# Patient Record
Sex: FEMALE | Race: WHITE | Employment: UNEMPLOYED | URBAN - METROPOLITAN AREA
[De-identification: names, ages, dates, MRNs, and addresses within clinical notes are randomized per-mention and may not be internally consistent; named-entity substitution may affect disease eponyms.]

---

## 2019-03-28 PROCEDURE — 82390 ASSAY OF CERULOPLASMIN: CPT | Performed by: INTERNAL MEDICINE

## 2019-03-28 PROCEDURE — 83516 IMMUNOASSAY NONANTIBODY: CPT | Performed by: INTERNAL MEDICINE

## 2019-03-28 PROCEDURE — 81256 HFE GENE: CPT | Performed by: INTERNAL MEDICINE

## 2019-03-28 PROCEDURE — 86376 MICROSOMAL ANTIBODY EACH: CPT | Performed by: INTERNAL MEDICINE

## 2019-03-28 PROCEDURE — 82103 ALPHA-1-ANTITRYPSIN TOTAL: CPT | Performed by: INTERNAL MEDICINE

## 2019-03-28 PROCEDURE — 82784 ASSAY IGA/IGD/IGG/IGM EACH: CPT | Performed by: INTERNAL MEDICINE

## 2019-08-30 PROBLEM — M77.8 ENTHESOPATHY OF RIGHT WRIST: Status: ACTIVE | Noted: 2019-08-30

## 2019-10-10 PROBLEM — L71.9 ROSACEA: Status: ACTIVE | Noted: 2019-10-10

## 2020-08-27 PROBLEM — R13.19 ESOPHAGEAL DYSPHAGIA: Status: ACTIVE | Noted: 2020-08-27

## 2020-08-27 PROBLEM — K22.5: Status: ACTIVE | Noted: 2020-08-27

## 2021-08-11 ENCOUNTER — ORDER TRANSCRIPTION (OUTPATIENT)
Dept: ADMINISTRATIVE | Facility: HOSPITAL | Age: 46
End: 2021-08-11

## 2021-08-11 DIAGNOSIS — Z11.59 ENCOUNTER FOR SCREENING FOR OTHER VIRAL DISEASES: ICD-10-CM

## 2021-08-11 DIAGNOSIS — Z01.818 PREOP EXAMINATION: Primary | ICD-10-CM

## 2022-10-11 ENCOUNTER — APPOINTMENT (OUTPATIENT)
Dept: CT IMAGING | Facility: HOSPITAL | Age: 47
End: 2022-10-11
Attending: EMERGENCY MEDICINE
Payer: COMMERCIAL

## 2022-10-11 ENCOUNTER — HOSPITAL ENCOUNTER (EMERGENCY)
Facility: HOSPITAL | Age: 47
Discharge: HOME | End: 2022-10-12
Attending: EMERGENCY MEDICINE
Payer: COMMERCIAL

## 2022-10-11 ENCOUNTER — APPOINTMENT (OUTPATIENT)
Dept: ULTRASOUND IMAGING | Facility: HOSPITAL | Age: 47
End: 2022-10-11
Attending: EMERGENCY MEDICINE
Payer: COMMERCIAL

## 2022-10-11 VITALS
TEMPERATURE: 98 F | RESPIRATION RATE: 18 BRPM | BODY MASS INDEX: 21.17 KG/M2 | DIASTOLIC BLOOD PRESSURE: 88 MMHG | WEIGHT: 124 LBS | HEART RATE: 104 BPM | OXYGEN SATURATION: 96 % | HEIGHT: 64 IN | SYSTOLIC BLOOD PRESSURE: 134 MMHG

## 2022-10-11 DIAGNOSIS — N30.00 ACUTE CYSTITIS WITHOUT HEMATURIA: Primary | ICD-10-CM

## 2022-10-11 DIAGNOSIS — R10.9 ABDOMINAL PAIN OF UNKNOWN ETIOLOGY: ICD-10-CM

## 2022-10-11 DIAGNOSIS — N17.9 ACUTE KIDNEY INJURY (HCC): ICD-10-CM

## 2022-10-11 LAB
ALBUMIN SERPL-MCNC: 3.9 G/DL (ref 3.4–5)
ALBUMIN/GLOB SERPL: 0.9 {RATIO} (ref 1–2)
ALP LIVER SERPL-CCNC: 83 U/L
ALT SERPL-CCNC: 37 U/L
ANION GAP SERPL CALC-SCNC: 9 MMOL/L (ref 0–18)
AST SERPL-CCNC: 27 U/L (ref 15–37)
B-HCG UR QL: NEGATIVE
BASOPHILS # BLD AUTO: 0.04 X10(3) UL (ref 0–0.2)
BASOPHILS NFR BLD AUTO: 0.4 %
BILIRUB SERPL-MCNC: 0.5 MG/DL (ref 0.1–2)
BILIRUB UR QL STRIP.AUTO: NEGATIVE
BUN BLD-MCNC: 16 MG/DL (ref 7–18)
CALCIUM BLD-MCNC: 9.7 MG/DL (ref 8.5–10.1)
CHLORIDE SERPL-SCNC: 96 MMOL/L (ref 98–112)
CO2 SERPL-SCNC: 28 MMOL/L (ref 21–32)
COLOR UR AUTO: YELLOW
CREAT BLD-MCNC: 1.82 MG/DL
EOSINOPHIL # BLD AUTO: 0.01 X10(3) UL (ref 0–0.7)
EOSINOPHIL NFR BLD AUTO: 0.1 %
ERYTHROCYTE [DISTWIDTH] IN BLOOD BY AUTOMATED COUNT: 12.1 %
GFR SERPLBLD BASED ON 1.73 SQ M-ARVRAT: 34 ML/MIN/1.73M2 (ref 60–?)
GLOBULIN PLAS-MCNC: 4.2 G/DL (ref 2.8–4.4)
GLUCOSE BLD-MCNC: 120 MG/DL (ref 70–99)
GLUCOSE UR STRIP.AUTO-MCNC: 50 MG/DL
HCT VFR BLD AUTO: 39.8 %
HGB BLD-MCNC: 14.3 G/DL
IMM GRANULOCYTES # BLD AUTO: 0.05 X10(3) UL (ref 0–1)
IMM GRANULOCYTES NFR BLD: 0.5 %
KETONES UR STRIP.AUTO-MCNC: NEGATIVE MG/DL
LYMPHOCYTES # BLD AUTO: 1.08 X10(3) UL (ref 1–4)
LYMPHOCYTES NFR BLD AUTO: 10.7 %
MCH RBC QN AUTO: 36.2 PG (ref 26–34)
MCHC RBC AUTO-ENTMCNC: 35.9 G/DL (ref 31–37)
MCV RBC AUTO: 100.8 FL
MONOCYTES # BLD AUTO: 1.15 X10(3) UL (ref 0.1–1)
MONOCYTES NFR BLD AUTO: 11.4 %
NEUTROPHILS # BLD AUTO: 7.78 X10 (3) UL (ref 1.5–7.7)
NEUTROPHILS # BLD AUTO: 7.78 X10(3) UL (ref 1.5–7.7)
NEUTROPHILS NFR BLD AUTO: 76.9 %
NITRITE UR QL STRIP.AUTO: NEGATIVE
OSMOLALITY SERPL CALC.SUM OF ELEC: 278 MOSM/KG (ref 275–295)
PH UR STRIP.AUTO: 7 [PH] (ref 5–8)
PLATELET # BLD AUTO: 221 10(3)UL (ref 150–450)
POTASSIUM SERPL-SCNC: 3.3 MMOL/L (ref 3.5–5.1)
PROT SERPL-MCNC: 8.1 G/DL (ref 6.4–8.2)
PROT UR STRIP.AUTO-MCNC: >=500 MG/DL
RBC # BLD AUTO: 3.95 X10(6)UL
RBC UR QL AUTO: NEGATIVE
SODIUM SERPL-SCNC: 133 MMOL/L (ref 136–145)
SP GR UR STRIP.AUTO: 1.01 (ref 1–1.03)
UROBILINOGEN UR STRIP.AUTO-MCNC: <2 MG/DL
WBC # BLD AUTO: 10.1 X10(3) UL (ref 4–11)

## 2022-10-11 PROCEDURE — 87086 URINE CULTURE/COLONY COUNT: CPT | Performed by: EMERGENCY MEDICINE

## 2022-10-11 PROCEDURE — 81001 URINALYSIS AUTO W/SCOPE: CPT | Performed by: EMERGENCY MEDICINE

## 2022-10-11 PROCEDURE — 96374 THER/PROPH/DIAG INJ IV PUSH: CPT

## 2022-10-11 PROCEDURE — 85025 COMPLETE CBC W/AUTO DIFF WBC: CPT | Performed by: EMERGENCY MEDICINE

## 2022-10-11 PROCEDURE — 76830 TRANSVAGINAL US NON-OB: CPT | Performed by: EMERGENCY MEDICINE

## 2022-10-11 PROCEDURE — 96375 TX/PRO/DX INJ NEW DRUG ADDON: CPT

## 2022-10-11 PROCEDURE — 93975 VASCULAR STUDY: CPT | Performed by: EMERGENCY MEDICINE

## 2022-10-11 PROCEDURE — 85025 COMPLETE CBC W/AUTO DIFF WBC: CPT

## 2022-10-11 PROCEDURE — 99284 EMERGENCY DEPT VISIT MOD MDM: CPT

## 2022-10-11 PROCEDURE — 80053 COMPREHEN METABOLIC PANEL: CPT | Performed by: EMERGENCY MEDICINE

## 2022-10-11 PROCEDURE — 80053 COMPREHEN METABOLIC PANEL: CPT

## 2022-10-11 PROCEDURE — 74176 CT ABD & PELVIS W/O CONTRAST: CPT | Performed by: EMERGENCY MEDICINE

## 2022-10-11 PROCEDURE — 76856 US EXAM PELVIC COMPLETE: CPT | Performed by: EMERGENCY MEDICINE

## 2022-10-11 PROCEDURE — 81025 URINE PREGNANCY TEST: CPT

## 2022-10-11 RX ORDER — VALACYCLOVIR HYDROCHLORIDE 1 G/1
TABLET, FILM COATED ORAL EVERY 12 HOURS SCHEDULED
COMMUNITY

## 2022-10-11 RX ORDER — KETOROLAC TROMETHAMINE 15 MG/ML
15 INJECTION, SOLUTION INTRAMUSCULAR; INTRAVENOUS ONCE
Status: COMPLETED | OUTPATIENT
Start: 2022-10-11 | End: 2022-10-11

## 2022-10-11 RX ORDER — ONDANSETRON 2 MG/ML
INJECTION INTRAMUSCULAR; INTRAVENOUS
Status: COMPLETED
Start: 2022-10-11 | End: 2022-10-11

## 2022-10-11 RX ORDER — ONDANSETRON 2 MG/ML
4 INJECTION INTRAMUSCULAR; INTRAVENOUS ONCE
Status: COMPLETED | OUTPATIENT
Start: 2022-10-11 | End: 2022-10-11

## 2022-10-12 RX ORDER — CEPHALEXIN 500 MG/1
500 CAPSULE ORAL 3 TIMES DAILY
Qty: 21 CAPSULE | Refills: 0 | Status: SHIPPED | OUTPATIENT
Start: 2022-10-12 | End: 2022-10-19

## 2022-10-12 RX ORDER — HYDROCODONE BITARTRATE AND ACETAMINOPHEN 5; 325 MG/1; MG/1
1 TABLET ORAL EVERY 4 HOURS PRN
Qty: 10 TABLET | Refills: 0 | Status: SHIPPED | OUTPATIENT
Start: 2022-10-12 | End: 2022-10-14

## 2022-10-12 RX ORDER — CEPHALEXIN 500 MG/1
500 CAPSULE ORAL ONCE
Status: COMPLETED | OUTPATIENT
Start: 2022-10-12 | End: 2022-10-12

## 2022-10-12 NOTE — ED INITIAL ASSESSMENT (HPI)
Patient c/o dull RLQ pain x 1 week becoming constant throbbing pain this evening vomited x1.    denies fever or dysuria

## 2024-05-21 RX ORDER — NICOTINE POLACRILEX 4 MG/1
1 GUM, CHEWING ORAL DAILY
COMMUNITY

## 2024-06-05 ENCOUNTER — ANESTHESIA EVENT (OUTPATIENT)
Dept: ENDOSCOPY | Facility: HOSPITAL | Age: 49
End: 2024-06-05
Payer: COMMERCIAL

## 2024-06-06 ENCOUNTER — ANESTHESIA (OUTPATIENT)
Dept: ENDOSCOPY | Facility: HOSPITAL | Age: 49
End: 2024-06-06
Payer: COMMERCIAL

## 2024-06-06 ENCOUNTER — HOSPITAL ENCOUNTER (OUTPATIENT)
Facility: HOSPITAL | Age: 49
Setting detail: HOSPITAL OUTPATIENT SURGERY
Discharge: HOME | End: 2024-06-06
Attending: INTERNAL MEDICINE | Admitting: INTERNAL MEDICINE
Payer: COMMERCIAL

## 2024-06-06 VITALS
OXYGEN SATURATION: 100 % | DIASTOLIC BLOOD PRESSURE: 69 MMHG | RESPIRATION RATE: 20 BRPM | TEMPERATURE: 98 F | BODY MASS INDEX: 23.04 KG/M2 | HEART RATE: 74 BPM | SYSTOLIC BLOOD PRESSURE: 97 MMHG | HEIGHT: 63 IN | WEIGHT: 130 LBS

## 2024-06-06 PROCEDURE — 88305 TISSUE EXAM BY PATHOLOGIST: CPT | Performed by: INTERNAL MEDICINE

## 2024-06-06 PROCEDURE — 0D738ZZ DILATION OF LOWER ESOPHAGUS, VIA NATURAL OR ARTIFICIAL OPENING ENDOSCOPIC: ICD-10-PCS | Performed by: INTERNAL MEDICINE

## 2024-06-06 PROCEDURE — 0DB48ZX EXCISION OF ESOPHAGOGASTRIC JUNCTION, VIA NATURAL OR ARTIFICIAL OPENING ENDOSCOPIC, DIAGNOSTIC: ICD-10-PCS | Performed by: INTERNAL MEDICINE

## 2024-06-06 RX ORDER — SODIUM CHLORIDE, SODIUM LACTATE, POTASSIUM CHLORIDE, CALCIUM CHLORIDE 600; 310; 30; 20 MG/100ML; MG/100ML; MG/100ML; MG/100ML
INJECTION, SOLUTION INTRAVENOUS CONTINUOUS
Status: DISCONTINUED | OUTPATIENT
Start: 2024-06-06 | End: 2024-06-06

## 2024-06-06 RX ORDER — LIDOCAINE HYDROCHLORIDE 10 MG/ML
INJECTION, SOLUTION EPIDURAL; INFILTRATION; INTRACAUDAL; PERINEURAL AS NEEDED
Status: DISCONTINUED | OUTPATIENT
Start: 2024-06-06 | End: 2024-06-06 | Stop reason: SURG

## 2024-06-06 RX ADMIN — SODIUM CHLORIDE, SODIUM LACTATE, POTASSIUM CHLORIDE, CALCIUM CHLORIDE: 600; 310; 30; 20 INJECTION, SOLUTION INTRAVENOUS at 10:37:00

## 2024-06-06 RX ADMIN — LIDOCAINE HYDROCHLORIDE 25 MG: 10 INJECTION, SOLUTION EPIDURAL; INFILTRATION; INTRACAUDAL; PERINEURAL at 10:40:00

## 2024-06-06 NOTE — ANESTHESIA PREPROCEDURE EVALUATION
PRE-OP EVALUATION    Patient Name: Samantha Peter    Admit Diagnosis: ESOPHAGEAL DYSPHAGIA    Pre-op Diagnosis: ESOPHAGEAL DYSPHAGIA    UPPER ENDOSCOPY    Anesthesia Procedure: UPPER ENDOSCOPY    Surgeons and Role:     * Zeeshan Sales MD - Primary    Pre-op vitals reviewed.  Temp: 98.1 °F (36.7 °C)  Pulse: 79  Resp: 20  BP: 105/80  SpO2: 98 %  Body mass index is 23.03 kg/m².    Current medications reviewed.  Hospital Medications:   lactated ringers infusion   Intravenous Continuous       Outpatient Medications:     Medications Prior to Admission   Medication Sig Dispense Refill Last Dose    Omeprazole 20 MG Oral Tab EC Take 1 capsule by mouth daily.   6/5/2024    LISINOPRIL-HYDROCHLOROTHIAZIDE 20-25 MG Oral Tab TAKE 1 TABLET BY MOUTH EVERY DAY 90 tablet 0 6/6/2024 at 0900    MULTIVITAMINS OR TABS 1 TABLET DAILY   6/5/2024       Allergies: Patient has no known allergies.      Anesthesia Evaluation    Patient summary reviewed.    Anesthetic Complications  (-) history of anesthetic complications         GI/Hepatic/Renal    Negative GI/hepatic/renal ROS.                             Cardiovascular    Negative cardiovascular ROS.    Exercise tolerance: good     MET: >4      (+) hypertension   (+) hyperlipidemia                                  Endo/Other    Negative endo/other ROS.                              Pulmonary    Negative pulmonary ROS.                       Neuro/Psych    Negative neuro/psych ROS.                                  Past Surgical History:   Procedure Laterality Date    Appendectomy      Colonoscopy      Tonsillectomy       Social History     Socioeconomic History    Marital status:     Number of children: 3   Tobacco Use    Smoking status: Never    Smokeless tobacco: Never   Vaping Use    Vaping status: Never Used   Substance and Sexual Activity    Alcohol use: Yes     Alcohol/week: 8.3 standard drinks of alcohol     Types: 10 Standard drinks or equivalent per week     Comment: daily     Drug use: No    Sexual activity: Yes     Partners: Male     Birth control/protection: Vasectomy   Other Topics Concern    Seat Belt Yes     History   Drug Use No     Available pre-op labs reviewed.               Airway      Mallampati: II  Mouth opening: >3 FB  TM distance: > 6 cm  Neck ROM: full Cardiovascular    Cardiovascular exam normal.  Rhythm: regular  Rate: normal     Dental    Dentition appears grossly intact         Pulmonary    Pulmonary exam normal.  Breath sounds clear to auscultation bilaterally.               Other findings              ASA: 2   Plan: MAC  NPO status verified and patient meets guidelines.  Patient has not taken beta blockers in last 24 hours.  Post-procedure pain management plan discussed with surgeon and patient.      Plan/risks discussed with: patient and spouse                Present on Admission:  **None**

## 2024-06-06 NOTE — ANESTHESIA POSTPROCEDURE EVALUATION
[facility]    Samantha Peter Patient Status:  Hospital Outpatient Surgery   Age/Gender 49 year old female MRN [id number]   Location [facility] Attending Zeeshan Sales MD   Hosp Day # 0 PCP Emilia Gutierrez MD       Anesthesia Post-op Note    ESOPHAGOGASTRODUODENOSCOPY (EGD) WITH BIOPSIES AND BALLOON DILATION TO 20mm    Procedure Summary       Date: 06/06/24 Room / Location:  ENDOSCOPY 03 / [facility]    Anesthesia Start: 1037 Anesthesia Stop: 1100    Procedure: ESOPHAGOGASTRODUODENOSCOPY (EGD) WITH BIOPSIES AND BALLOON DILATION TO 20mm Diagnosis: (esophageal stricture,hiatal hernia)    Surgeons: Zeeshan Sales MD Anesthesiologist: Severo Barbosa MD    Anesthesia Type: MAC ASA Status: 2            Anesthesia Type: MAC    Vitals Value Taken Time   /76 06/06/24 1102   Temp 98.0 06/06/24 1102   Pulse 83 06/06/24 1102   Resp 16 06/06/24 1102   SpO2 100% 06/06/24 1102       Patient Location: Endoscopy    Anesthesia Type: MAC    Airway Patency: patent    Postop Pain Control: adequate    Mental Status: mildly sedated but able to meaningfully participate in the post-anesthesia evaluation    Nausea/Vomiting: none    Cardiopulmonary/Hydration status: stable euvolemic    Complications: no apparent anesthesia related complications    Postop vital signs: stable    Dental Exam: Unchanged from Preop    Patient to be discharged home when criteria met.

## 2024-06-06 NOTE — BRIEF OP NOTE
Pre-Operative Diagnosis: ESOPHAGEAL DYSPHAGIA     Post-Operative Diagnosis: esophageal stricture,hiatal hernia      Procedure Performed:   ESOPHAGOGASTRODUODENOSCOPY (EGD) WITH BIOPSIES AND BALLOON DILATION TO 20mm    Surgeons and Role:     * Zeeshan Sales MD - Primary    Assistant(s):        Surgical Findings: see above     Specimen: GE junction     Estimated Blood Loss: No data recorded    Dictation Number:  none    Zeeshan Sales MD  6/6/2024  10:53 AM

## 2024-06-06 NOTE — DISCHARGE INSTRUCTIONS
ENDOSCOPY DISCHARGE INSTRUCTIONS    Procedure Performed:   Gastroscopy and Esophageal Dilatation  Endoscopist: No name on file  FINDINGS:   Esophageal stricture (narrowing in esophagus) and Hiatal hernia (stomach slides up into chest through diaphragm)    MEDICATIONS:  You may resume all other medications today    DIET:  General    BIOPSIES:  Biopsies were taken (you will be notified of results in 7-10 days)      ADDITIONAL RECOMMENDATIONS:  Do not take aspirin or any other aspirin-like medication for 10 days after the procedure.   Continue omeprazole 20 mg daily.  Follow up with Dr. Sales in 9 - 12 months or sooner if needed    Activity for remainder of today:    REST TODAY  DO NOT drive or operate heavy machinery  DO NOT drink any alcoholic beverages  DO NOT sign any legal documents or make any important decisions    After your procedure(s):  It is not unusual to feel bloated or gassy .  Passing gas and belching is encouraged. Lying on your left side with your knees flexed may relieve the discomfort. A hot pack to the abdomen may also help.    After your gastroscopy (upper endoscopy): You may experience a slight sore throat which will subside. Throat lozenges or salt water gargle can be used.    FOLLOW-UP:  Contact the office at [phone] for follow-up appointment if needed or if you develop any of the following:    Severe abdominal pain/discomfort       Excessive bleeding or black tarry stool  Difficulty breathing or swallowing        Persistent nausea,vomiting, or a fever above 100 degrees or chills

## 2024-06-06 NOTE — OPERATIVE REPORT
PATIENT NAME: Samantha Peter  MRN: [id number]  DATE OF OPERATION: 6/6/2024  REFERRING PHYSICIAN: Dr. Gutierrez  Medications:  MAC  PREOPERATIVE DIAGNOSIS   Dysphagia   POSTOPERATIVE DIAGNOSIS:  Schatzki's ring at the GE junction, biopsied and dilated with a balloon to 20 mm.  3 cm hiatal hernia.  Normal stomach and duodenum.    PROCEDURE PERFORMED:  Esophagogastroduodenoscopy with biopsy and esophogeal balloon dilation  Endoscopist:  Zeeshan Sales MD     PROCEDURE AND FINDINGS: The patient was placed into the left lateral decubitus after informed consent was obtained.  All questions were answered.  An updated history and physical were performed and an ASA score of 2 was assigned.  Informed consent was obtained prior to the procedure.  Complications were reviewed including risks of bleeding and perforation.   MAC was administered.        The HELM Boots video gastroscope was introduced into the mouth and passed into the esophogus after administration of MAC.  The entire examined esophagus was remarkable for a smooth tapered circumferential narrowing at the GE junction consistent with a Schatzki's ring, which was extensively biopsied.  There was also a 3 cm hiatal hernia.  The entire examined stomach,  including retroflexion view which was remarkable for normal mucosa.  The  duodenum was examined to the third portion and was normal.   The scope was then pulled back into the esophogus.    Esophogeal balloon dilation was performed.   An 18, 19 and 20 mm balloon were each inflated across the gastroesophogeal junction for 30 seconds.  Adequate dilation was noted as several small tears were noted in the Schatzki's ring.  There was resistance to only the 20 mm balloon.   The gastroscope was removed from the patient.  The procedure was completed. The patient tolerated the procedure well.  RECOMMENDATIONS   Avoid aspirin and nsaids for 7 days.  The patient will be provided with a written summary of today's endoscopic findings.  The  patient will be notified with biopsy results in 1 - 2 weeks.   Continue omeprazole 20 mg po qAM.  Follow up for repeat egd and dilation if dysphagia recurs.  Zeeshan Sales MD, Gastroenterologist  Cc: Dr. Gutierrez

## 2024-06-06 NOTE — H&P
REFERRING PHYSICIAN:      HPI:  Samantha Peter is a 49 year old female. Consult requested by Dr. Gutierrez for complaints of dysphagia.  Dysphagia mainly to pills mainly.  Feels like pills get stuck in distal esophagus, occurring with any oblong pills over the last  - 2 years hx of gastroesophageal reflux - on omeprazole - takes most days.   Heartburn with various foods, and occasional nocturnal heartburn.   No episodes of esophogeal obstruction with vomiting.  No weight loss and no anorexia.  Prior diagnostic work up includes none. Hx of  seasonal allergies.      PAST GI HISTORY: GERD   Prior GI endoscopies 2023 - colonoscopy      Gastroenterology Return Patient Visit     Samantha Peter  [id number]  1/31/1975     Eduardo Yadav     Chief Complaint and History of Present Illness:      This is a 47 year old female with bloating and nausea. The symptoms occur about 2-3 times per year. She will have diarrhea at the same time. She will have a few loose stools also. The symptoms last about 5-6 days and then resolve.      She uses ibuprofen daily.      She also has a history of elevated transaminases. Her evaluation in the past reveals a fatty liver. She will drink 1-4 glasses of wine per day.      She has a family history of hemochromatosis. 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  ASSESSMENT AND PLAN:      1. Fatty liver  2. Elevated transaminases  3. Nausea  4. Bloating  5. Colon cancer screening     The symptoms may be functional. Diet may play a role. She also has a fatty liver. Alcohol and dyslipidemia are risk factors. She also has not had a colonoscopy. She is average risk for colon cancer.        Try to avoid Advil.  Limit foods that may cause symptom.  Limit wine to 1-2 glasses per day.   Repeat liver enzymes in April.  Schedule colonoscopy. The procedure was reviewed with the patient. The patient is aware of the risks, including bleeding, perforation and anesthetic complications. The limitations of the  procedure were reviewed. The patient agrees and all questions were answered.           Hector Grace M.D.             Electronically signed by Hector Grace MD at 2022  6:08 PM     CT ABDOMEN+PELVIS KIDNEYSTONE 2D RNDR(NO IV,NO ORAL)(CPT=74176)  Order: [id number]  Impression     CONCLUSION:    1. Specific etiology for abdominal pain is not evident from this noncontrast study.  There are no obstructing renal or ureteral stones.  2. There is diverticulosis of the colon without CT evidence of diverticulitis.  3. There is a fat containing periumbilical hernia.  4. There is density in the dependent part of gallbladder which could represent noncalcified cholelithiasis or gallbladder sludge.        Dictated by (CST): Neeraj More MD on 10/11/2022 at 10:45 PM      Finalized by (CST): Neeraj More MD on 10/11/2022 at 10:48 PM    Narrative     PROCEDURE:  CT ABDOMEN+PELVIS KIDNEYSTONE 2D RNDR(NO IV,NO ORAL)(CPT=74176)     Colonoscopy Report                  Samantha Peter      1975 MRN [id number]         DATE OF OPERATION: 2023      PREOPERATIVE DIAGNOSIS:      Colon cancer screening     POSTOPERATIVE DIAGNOSIS:      Diverticulosis      SURGERY PERFORMED:      Colonoscopy, entire colon      SURGEON: Hector Grace MD     ANESTHESIA: MAC     HISTORY OF PRESENT ILLNESS:       The patient is a 48 year old female who presents for screening colonoscopy.  The patient is asymptomatic.  There is no family history of  colon cancer.  The patient has not had a previous colonoscopy.      REPORT OF OPERATION:      The procedure was reviewed with the patient. The patient is aware of the indications. The risks of bleeding, perforation and anesthetic complications have been reviewed. The possibility of missed lesions were reviewed.     After the patient was placed in the left lateral decubitus position,the video colonoscope was inserted into the rectum and was advanced to the cecum.  The scope was withdrawn and  the mucosa was observed for abnormalities.     FINDINGS:     The visualized colonic mucosa reveals sigmoid diverticulosis. The remainder of the colon is normal. At the anal verge, grade 2 internal hemorrhoids are identified.     The patient tolerated the procedure well without any immediate complications.     Aronchick bowel prep score was 1. (1 - excellent > 95% mucosa seen; 2 - good - clear liquid up to 25% of the mucosa, 90% mucosa seen;  3 - fair - semisolid stool not suctioned, but 90% of the mucosa seen; 4 - poor - semisolid stool not suctioned, but < 90% mucosa seen; 5 - inadequate - repeat prep needed)      The colon withdrawal time was 8 minutes.     PLAN:     Colonoscopy in 10 years.  High fiber diet.      Allergy:   Allergies   No Known Allergies            Current Outpatient Medications   Medication Sig Dispense Refill    HYDROcodone-acetaminophen (NORCO) 5-325 MG Oral Tab Take 1 tablet by mouth every 6 (six) hours as needed for Pain. 12 tablet 0    rosuvastatin 20 MG Oral Tab TAKE 1 TABLET BY MOUTH EVERY DAY AT NIGHT 90 tablet 0    lisinopril-hydroCHLOROthiazide 20-25 MG Oral Tab Take 1 tablet by mouth daily. 100 tablet 0    rosuvastatin (CRESTOR) 20 MG Oral Tab 1 po every other day alternating with 2 qod 135 tablet 3    gabapentin 300 MG Oral Cap Take 1 capsule (300 mg total) by mouth nightly. 90 capsule 3    SOOLANTRA 1 % External Cream          hydrocortisone 2.5 % External Ointment APPLY TO AFFECTED AREA OF FACE TWICE A DAY AS NEEDED FOR ECZEMA        mometasone 0.1 % External Ointment APPLY TO ROUGH AREAS OF TRUNK AND EXTREMITIES TWICE DAILY AS NEEDED.        triamcinolone 0.1 % External Cream APPLY TWICE TO AA OF CHEST, ARMS AND BACK. AVOID FACE, UNDERARMS AND GROIN.        Probiotic Product (PROBIOTIC-10 OR) Take by mouth.        MULTIVITAMINS OR TABS 1 TABLET DAILY               Past Medical History:   Diagnosis Date    Gastritis 11/2013    Hyperlipidemia      Unspecified essential hypertension               Past Surgical History:   Procedure Laterality Date    APPENDECTOMY        COLONOSCOPY N/A 12/7/2023     Procedure: COLONOSCOPY;  Surgeon: Hector Grace MD;  Location: [facility]    TONSILLECTOMY             Social History    Tobacco Use      Smoking status: Never      Smokeless tobacco: Never    Vaping Use      Vaping Use: Never used    Alcohol use: Yes      Alcohol/week: 8.3 standard drinks of alcohol      Types: 10 Standard drinks or equivalent per week      Comment: 10-15 glasses of wine/week    Drug use: No     Occupation: teacher  Marital status:  with children  Aspirin and/ or NSAID use:  yes     FAMILY HX: GI HX: None, no hx of colon cancer        Family History   Problem Relation Age of Onset    Other (Other) Father           peptic ulcer, bleeding. diverticulits         REVIEW OF SYSTEMS:  GENERAL: feels well otherwise  SKIN: denies any unusual skin lesions  EYES: denies blurred vision or double vision  HEENT: denies nasal congestion, sinus pain or ST  LUNGS: denies shortness of breath with exertion  CARDIOVASCULAR: denies chest pain on exertion  GI: as above  : denies nocturia or changes in stream  MUSCULOSKELETAL: denies back pain  NEURO: denies headaches  PSYCHE: denies depression or anxiety  HEMATOLOGIC: denies hx of anemia  ENDOCRINE: denies thyroid history  ALL/ASTHMA: denies hx of allergy or asthma     EXAM:  There were no vitals taken for this visit.  GENERAL: well developed, well nourished, in no apparent distress  SKIN: no rashes, no suspicious lesions  HEENT: atraumatic, normocephalic, ears and throat are clear  NECK: supple, no adenopathy, no bruits  CHEST: no chest tenderness  LUNGS: clear to auscultation  CARDIO: RRR without murmur  GI: good BS's and no masses, HSM or tenderness  RECTAL: Exam not done.  MUSCULOSKELETAL: back is not tender,FROM of the back  EXTREMITIES: no cyanosis, clubbing or edema  Component      Latest Ref Rng 11/29/2023   WBC      4.00  - 13.00 10^3/uL 5.67    RBC      3.80 - 5.10 10^6/uL 4.17    Hemoglobin      12.0 - 16.0 g/dL 14.0    Hematocrit      34.0 - 50.0 % 40.7    MCV      81.0 - 100.0 fL 97.6    MCH      27.0 - 33.2 pg 33.6 (H)    MCHC      31.0 - 37.0 g/dL 34.4    Platelet Count      150 - 450 10^3/uL 246    RDW      11.5 - 16.0 % 12.2    MEAN PLATELET VOLUME      7.0 - 11.5 fL 9.3    ALT (SGPT)      0 - 33 U/L 25    AST (SGOT)      0 - 32 U/L 30    Albumin      3.5 - 5.2 g/dL 4.7    ALKALINE PHOSPHATASE      39 - 100 U/L 97    Bilirubin, Total      0.00 - 1.20 mg/dL 0.48    PROTEIN, TOTAL      6.4 - 8.3 g/dL 7.9    Bilirubin, Direct      0.0 - 0.3 mg/dL <0.2    Triglycerides      <=150 mg/dL 168 (H)    HDL Cholesterol      >=40 mg/dL 53    Risk Factor      <5.0  4.3    Cholesterol      <=200 mg/dL 230 (H)    LDL Cholesterol Calc      <=130 mg/dL 143 (H)    Chol/HDL Ratio      0.0 - 4.5 ratio 4    VLDL Cholesterol Oleg      <=30 mg/dL 34 (H)    C-REACTIVE PROTEIN      <=0.50 mg/dL 0.34          Assessment:  Dysphagia               The patient's complaint of dysphagia seems most likely related to a peptic stricture from GERD vs eosinophillic esophogitis.   Other diagnostic possibilities include gastroesophageal cancer.               Long standing hx of incompletely treated gastroesophageal reflux.  Plan:       1. EGD with MACwith possible dilation.     Risks of perforation and bleeding reviewed.       2.  Continue omeprazole 20 mg daily.

## (undated) DEVICE — HERCULES 3 STAGE BALLOON ESOPHAGEAL: Brand: HERCULES

## (undated) DEVICE — BITEBLOCK ENDOSCP 60FR MAXI STRP

## (undated) DEVICE — KIT VLV 5 PC AIR H2O SUCT BX ENDOGATOR CONN

## (undated) DEVICE — GIJAW SINGLE-USE BIOPSY FORCEPS WITH NEEDLE: Brand: GIJAW

## (undated) DEVICE — 10FT COMBINED O2 DELIVERY/CO2 MONITORING. FILTER WITH MICROSTREAM TYPE LUER: Brand: DUAL ADULT NASAL CANNULA

## (undated) DEVICE — KIT CUSTOM ENDOPROCEDURE STERIS

## (undated) DEVICE — 3M™ RED DOT™ MONITORING ELECTRODE WITH FOAM TAPE AND STICKY GEL, 50/BAG, 20/CASE, 72/PLT 2570: Brand: RED DOT™

## (undated) DEVICE — 1200CC GUARDIAN II: Brand: GUARDIAN

## (undated) DEVICE — DEVICE INFL 60ML 15ATM PRSS COOK SPHR